# Patient Record
(demographics unavailable — no encounter records)

---

## 2024-10-19 NOTE — PLAN
[FreeTextEntry1] : Continued current management. Call with any questions. May call ahead of appointment to get GLP-1 medication sent to the pharmacy to avoid gap in treatment.

## 2024-10-19 NOTE — HISTORY OF PRESENT ILLNESS
[FreeTextEntry1] : Follow up HTN, HLD and weight loss management [de-identified] : Patient is following up for management of HTN, HLD and GLI-1 therapy for weight loss (Patient desired goal weight 200 lbs). Patient reports she is doing well. She continues to lose weight at a good pace. She is currently attending physical therapy, which she notes has improved her physical health.  Quique

## 2024-10-19 NOTE — HISTORY OF PRESENT ILLNESS
[FreeTextEntry1] : Follow up HTN, HLD and weight loss management [de-identified] : Patient is following up for management of HTN, HLD and GLI-1 therapy for weight loss (Patient desired goal weight 200 lbs). Patient reports she is doing well. She continues to lose weight at a good pace. She is currently attending physical therapy, which she notes has improved her physical health.  Quique

## 2024-10-19 NOTE — PHYSICAL EXAM
[Supple] : supple [Normal] : no respiratory distress, lungs were clear to auscultation bilaterally and no accessory muscle use [Normal Rate] : normal rate  [Regular Rhythm] : with a regular rhythm [Normal S1, S2] : normal S1 and S2 [No Joint Swelling] : no joint swelling [Grossly Normal Strength/Tone] : grossly normal strength/tone

## 2024-11-18 NOTE — HISTORY OF PRESENT ILLNESS
[FreeTextEntry1] : Follow up on weight loss. [de-identified] : 76-year-old female patient who presents for a follow-up consultation regarding weight loss. Patient is doing well. She is currently on the 10 mg dose, would like to stay at this dose as weight loss progress is satisfactory. Patient would like refill of medication for GERD and overactive bladder.

## 2024-11-18 NOTE — HISTORY OF PRESENT ILLNESS
[FreeTextEntry1] : Follow up on weight loss. [de-identified] : 76-year-old female patient who presents for a follow-up consultation regarding weight loss. Patient is doing well. She is currently on the 10 mg dose, would like to stay at this dose as weight loss progress is satisfactory. Patient would like refill of medication for GERD and overactive bladder.

## 2024-12-12 NOTE — ASSESSMENT
[FreeTextEntry1] : 77-year-old with a past medical history of hypertension who presents for weight loss management.  Her blood pressure is slightly elevated above goal.

## 2024-12-12 NOTE — HISTORY OF PRESENT ILLNESS
[FreeTextEntry1] : Weight management follow up [de-identified] : Patient is following up to evaluate progress with weight loss.  She is currently on 10 mg of Zepbound.  She feels like she lost more weight on the 7.5 mg dosage since during that month she lost 9 pounds.  Today she requested weight decrease of 4 pounds since 4 weeks ago.  She reports feeling well and has occasional constipation that typically resolves within a couple of days. She is planning a cruise with her  at the end of December for 1 week and she looks forward to it.

## 2025-01-13 NOTE — PLAN
[FreeTextEntry1] : - Change fexofenadine prescription to 60mg twice daily. - Adjust weight loss medication to 7.5mg, per patient request as she felt the weight loss progress was better at that dose. This represents a step down in strength. We will re-evaluate in 1 month. - Administer PREVNAR (pneumococcal vaccine). - Monitor respiratory symptoms; patient to call office if worsening occurs. In the past, her URIs have progressed to bronchitis, requiring treatment with prednisone and azithromycin due to chronic lung condition.

## 2025-01-13 NOTE — ASSESSMENT
[FreeTextEntry1] : Upper respiratory infection, likely viral. Allergic rhinitis. Obesity, with recent weight loss progress.

## 2025-01-13 NOTE — HISTORY OF PRESENT ILLNESS
[FreeTextEntry1] : 1 MONTH F/U for weight management Having cold symptoms.  [de-identified] : Patient reports having a cold with nasal drip but no sore throat or ear pain. She has been using fexofenadine for symptom relief, which has been effective but she's unsure if it she could increase the frequency of dosing. The patient also mentions recent weight loss progress and states she is doing well. She was on a cruise over the holidays season and had a nice time.

## 2025-01-13 NOTE — HISTORY OF PRESENT ILLNESS
[FreeTextEntry1] : 1 MONTH F/U for weight management Having cold symptoms.  [de-identified] : Patient reports having a cold with nasal drip but no sore throat or ear pain. She has been using fexofenadine for symptom relief, which has been effective but she's unsure if it she could increase the frequency of dosing. The patient also mentions recent weight loss progress and states she is doing well. She was on a cruise over the holidays season and had a nice time.

## 2025-01-13 NOTE — REVIEW OF SYSTEMS
[Nasal Discharge] : nasal discharge [Postnasal Drip] : postnasal drip [Negative] : Heme/Lymph [Fever] : no fever [Chills] : no chills [Night Sweats] : no night sweats [Earache] : no earache [Sore Throat] : no sore throat

## 2025-02-21 NOTE — HISTORY OF PRESENT ILLNESS
[FreeTextEntry1] :  VITALY is a 77 year-old female here for a weight management follow up [de-identified] : The patient has been actively working on her weight loss and has shown significant progress losing a total of 44 pounds up to the date of the visit. She expresses concerns about her weight loss medication dosage, mentioning that she seems to have lost more weight on a lower dose compared to the current higher dose. She reported feeling fine and had no complaints regarding her general health.

## 2025-02-21 NOTE — PLAN
[FreeTextEntry1] :     - We will reassess the effectiveness of the current dosage (10) of the weight loss medication after one month.     - Continue with physical activity.     - Blood work-up due in April.

## 2025-02-21 NOTE — HISTORY OF PRESENT ILLNESS
[FreeTextEntry1] :  VITALY is a 77 year-old female here for a weight management follow up [de-identified] : The patient has been actively working on her weight loss and has shown significant progress losing a total of 44 pounds up to the date of the visit. She expresses concerns about her weight loss medication dosage, mentioning that she seems to have lost more weight on a lower dose compared to the current higher dose. She reported feeling fine and had no complaints regarding her general health.

## 2025-02-21 NOTE — ASSESSMENT
[FreeTextEntry1] : The patient shows significant progress on her weight loss program and has no complaints about her general health. However, there are some concerns regarding the effectiveness of her current dosage of weight loss medication.

## 2025-03-20 NOTE — PLAN
[FreeTextEntry1] :     - Reduce medication dosage to 7.5 mg, per patient request     - Encourage healthy dietary choices     - Recommendation for regular exercise     - Follow-up visit next month with fasting blood work

## 2025-03-20 NOTE — HISTORY OF PRESENT ILLNESS
[FreeTextEntry1] : follow up weight management [de-identified] : The patient expressed concern over her weight fluctuations. She admits to some dietary indiscretion. Patient observed that she achieved the most loss in weight when she was on 7.5 mg of Zepbound and would like to be at that dose. They also reported a gastroenteritis-like illness that lasted three days roughly a month and a half ago.  She is not exercising as much.

## 2025-03-20 NOTE — HISTORY OF PRESENT ILLNESS
[FreeTextEntry1] : follow up weight management [de-identified] : The patient expressed concern over her weight fluctuations. She admits to some dietary indiscretion. Patient observed that she achieved the most loss in weight when she was on 7.5 mg of Zepbound and would like to be at that dose. They also reported a gastroenteritis-like illness that lasted three days roughly a month and a half ago.  She is not exercising as much.

## 2025-04-23 NOTE — HISTORY OF PRESENT ILLNESS
[FreeTextEntry1] : Weight management [de-identified] : Patient was placed back on 7.5mg Zepbound and seems to be doing better on it than she did on the 10 mg dose. Overall, she feels well and has no acute complaints. She has been staying active, but her exercise regimen was disrupted by 's health issues but plans to get back on a routine. She indicates eating healthy meals most of the time.

## 2025-04-23 NOTE — HISTORY OF PRESENT ILLNESS
[FreeTextEntry1] : Weight management [de-identified] : Patient was placed back on 7.5mg Zepbound and seems to be doing better on it than she did on the 10 mg dose. Overall, she feels well and has no acute complaints. She has been staying active, but her exercise regimen was disrupted by 's health issues but plans to get back on a routine. She indicates eating healthy meals most of the time.

## 2025-06-09 NOTE — HISTORY OF PRESENT ILLNESS
[FreeTextEntry1] : Weight management follow up [de-identified] : Patient is a 77-year female who presents for weight medication management. She reports feeling well overall. She had been off medication for 1 weeks and resumed. She denies any side effects. She tries to stay active and overall has been eating less which is helping with the consistent weight loss.